# Patient Record
Sex: FEMALE | Race: WHITE | NOT HISPANIC OR LATINO | URBAN - METROPOLITAN AREA
[De-identification: names, ages, dates, MRNs, and addresses within clinical notes are randomized per-mention and may not be internally consistent; named-entity substitution may affect disease eponyms.]

---

## 2022-10-31 ENCOUNTER — APPOINTMENT (RX ONLY)
Dept: URBAN - METROPOLITAN AREA CLINIC 9 | Facility: CLINIC | Age: 52
Setting detail: DERMATOLOGY
End: 2022-10-31

## 2022-10-31 DIAGNOSIS — L82.1 OTHER SEBORRHEIC KERATOSIS: ICD-10-CM | Status: STABLE

## 2022-10-31 DIAGNOSIS — D22 MELANOCYTIC NEVI: ICD-10-CM | Status: STABLE

## 2022-10-31 DIAGNOSIS — L57.8 OTHER SKIN CHANGES DUE TO CHRONIC EXPOSURE TO NONIONIZING RADIATION: ICD-10-CM | Status: INADEQUATELY CONTROLLED

## 2022-10-31 DIAGNOSIS — L20.89 OTHER ATOPIC DERMATITIS: ICD-10-CM | Status: INADEQUATELY CONTROLLED

## 2022-10-31 DIAGNOSIS — D18.0 HEMANGIOMA: ICD-10-CM | Status: STABLE

## 2022-10-31 PROBLEM — L20.84 INTRINSIC (ALLERGIC) ECZEMA: Status: ACTIVE | Noted: 2022-10-31

## 2022-10-31 PROBLEM — D18.01 HEMANGIOMA OF SKIN AND SUBCUTANEOUS TISSUE: Status: ACTIVE | Noted: 2022-10-31

## 2022-10-31 PROBLEM — D22.5 MELANOCYTIC NEVI OF TRUNK: Status: ACTIVE | Noted: 2022-10-31

## 2022-10-31 PROCEDURE — ? PRESCRIPTION

## 2022-10-31 PROCEDURE — 99204 OFFICE O/P NEW MOD 45 MIN: CPT

## 2022-10-31 PROCEDURE — ? COSMETIC CONSULTATION: CHEMICAL PEELS

## 2022-10-31 PROCEDURE — ? COUNSELING

## 2022-10-31 PROCEDURE — ? PRESCRIPTION MEDICATION MANAGEMENT

## 2022-10-31 RX ORDER — TRIAMCINOLONE ACETONIDE 1 MG/G
CREAM TOPICAL BID
Qty: 80 | Refills: 3 | Status: ERX | COMMUNITY
Start: 2022-10-31

## 2022-10-31 RX ADMIN — TRIAMCINOLONE ACETONIDE: 1 CREAM TOPICAL at 00:00

## 2022-10-31 ASSESSMENT — LOCATION ZONE DERM
LOCATION ZONE: FACE
LOCATION ZONE: TRUNK
LOCATION ZONE: ARM

## 2022-10-31 ASSESSMENT — LOCATION SIMPLE DESCRIPTION DERM
LOCATION SIMPLE: LEFT CHEEK
LOCATION SIMPLE: LEFT FOREHEAD
LOCATION SIMPLE: ABDOMEN
LOCATION SIMPLE: RIGHT UPPER BACK
LOCATION SIMPLE: LEFT ELBOW

## 2022-10-31 ASSESSMENT — LOCATION DETAILED DESCRIPTION DERM
LOCATION DETAILED: LEFT INFERIOR FOREHEAD
LOCATION DETAILED: RIGHT LATERAL UPPER BACK
LOCATION DETAILED: LEFT INFERIOR CENTRAL MALAR CHEEK
LOCATION DETAILED: LEFT LATERAL ABDOMEN
LOCATION DETAILED: LEFT LATERAL ELBOW
LOCATION DETAILED: RIGHT SUPERIOR UPPER BACK

## 2022-10-31 NOTE — PROCEDURE: PRESCRIPTION MEDICATION MANAGEMENT
Detail Level: Zone
Render In Strict Bullet Format?: No
Initiate Treatment: PC Tretinoin 0.025% QHS
Initiate Treatment: Triamcinolone 0.1% cream BID x14 days

## 2022-10-31 NOTE — HPI: EVALUATION OF SKIN LESION(S)
What Type Of Note Output Would You Prefer (Optional)?: Bullet Format
Hpi Title: Evaluation of Skin Lesions
How Severe Are Your Spot(S)?: mild
Have Your Spot(S) Been Treated In The Past?: has not been treated
Additional History: Cosmetic options for sun spots \\nItchy patch on left arm happens when she’s stressed on and off for a few months

## 2025-02-24 ENCOUNTER — APPOINTMENT (OUTPATIENT)
Dept: URBAN - METROPOLITAN AREA CLINIC 9 | Facility: CLINIC | Age: 55
Setting detail: DERMATOLOGY
End: 2025-02-24

## 2025-02-24 DIAGNOSIS — L57.8 OTHER SKIN CHANGES DUE TO CHRONIC EXPOSURE TO NONIONIZING RADIATION: ICD-10-CM | Status: STABLE

## 2025-02-24 DIAGNOSIS — H01.13 ECZEMATOUS DERMATITIS OF EYELID: ICD-10-CM | Status: INADEQUATELY CONTROLLED

## 2025-02-24 DIAGNOSIS — L82.1 OTHER SEBORRHEIC KERATOSIS: ICD-10-CM | Status: STABLE

## 2025-02-24 DIAGNOSIS — D22 MELANOCYTIC NEVI: ICD-10-CM | Status: STABLE

## 2025-02-24 DIAGNOSIS — L81.4 OTHER MELANIN HYPERPIGMENTATION: ICD-10-CM | Status: STABLE

## 2025-02-24 DIAGNOSIS — D18.0 HEMANGIOMA: ICD-10-CM | Status: STABLE

## 2025-02-24 PROBLEM — D18.01 HEMANGIOMA OF SKIN AND SUBCUTANEOUS TISSUE: Status: ACTIVE | Noted: 2025-02-24

## 2025-02-24 PROBLEM — D22.62 MELANOCYTIC NEVI OF LEFT UPPER LIMB, INCLUDING SHOULDER: Status: ACTIVE | Noted: 2025-02-24

## 2025-02-24 PROBLEM — H01.139 ECZEMATOUS DERMATITIS OF UNSPECIFIED EYE, UNSPECIFIED EYELID: Status: ACTIVE | Noted: 2025-02-24

## 2025-02-24 PROBLEM — H01.134 ECZEMATOUS DERMATITIS OF LEFT UPPER EYELID: Status: ACTIVE | Noted: 2025-02-24

## 2025-02-24 PROCEDURE — 99213 OFFICE O/P EST LOW 20 MIN: CPT

## 2025-02-24 PROCEDURE — ? TREATMENT REGIMEN

## 2025-02-24 PROCEDURE — ? FULL BODY SKIN EXAM

## 2025-02-24 PROCEDURE — ? PRESCRIPTION

## 2025-02-24 PROCEDURE — ? ORDER FOR PATCH TESTING

## 2025-02-24 PROCEDURE — ? COUNSELING

## 2025-02-24 PROCEDURE — ? PRESCRIPTION MEDICATION MANAGEMENT

## 2025-02-24 RX ORDER — TACROLIMUS 1 MG/G
OINTMENT TOPICAL BID
Qty: 30 | Refills: 2 | Status: ERX | COMMUNITY
Start: 2025-02-24

## 2025-02-24 RX ADMIN — TACROLIMUS: 1 OINTMENT TOPICAL at 00:00

## 2025-02-24 ASSESSMENT — LOCATION DETAILED DESCRIPTION DERM
LOCATION DETAILED: LEFT PROXIMAL DORSAL FOREARM
LOCATION DETAILED: LEFT CENTRAL EYEBROW
LOCATION DETAILED: RIGHT MEDIAL SUPERIOR CHEST
LOCATION DETAILED: LEFT SUPERIOR UPPER BACK
LOCATION DETAILED: LEFT MEDIAL SUPERIOR EYELID
LOCATION DETAILED: EPIGASTRIC SKIN
LOCATION DETAILED: LEFT SUPERIOR MEDIAL UPPER BACK

## 2025-02-24 ASSESSMENT — LOCATION ZONE DERM
LOCATION ZONE: TRUNK
LOCATION ZONE: ARM
LOCATION ZONE: FACE
LOCATION ZONE: EYELID

## 2025-02-24 ASSESSMENT — LOCATION SIMPLE DESCRIPTION DERM
LOCATION SIMPLE: LEFT SUPERIOR EYELID
LOCATION SIMPLE: LEFT FOREARM
LOCATION SIMPLE: ABDOMEN
LOCATION SIMPLE: LEFT UPPER BACK
LOCATION SIMPLE: CHEST
LOCATION SIMPLE: LEFT EYEBROW

## 2025-02-24 NOTE — PROCEDURE: ORDER FOR PATCH TESTING
Patch Test Reading Schedule: First Reading at 48 hours and Second Reading at 72 hours
Patch Test To Be Applied: [employer] 80 Series
Detail Level: Simple
Counseling: I discussed the timing of the procedure and ensured the patient understands that this test requires multiple visits. No topical steroids applied should be applied to the patch testing location and no oral prednisone for two week prior to the test. While the patches are in place they should be kept dry which will limit bathing, swimming an exercise. I also explained that it is common for testing to be negative and this doesn't mean there isn't a allergic reaction occurring. During the testing itching is common.
Location Patches Should Be Applied: Back

## 2025-04-07 ENCOUNTER — APPOINTMENT (OUTPATIENT)
Dept: URBAN - METROPOLITAN AREA CLINIC 9 | Facility: CLINIC | Age: 55
Setting detail: DERMATOLOGY
End: 2025-04-07

## 2025-04-07 DIAGNOSIS — L259 CONTACT DERMATITIS AND OTHER ECZEMA, UNSPECIFIED CAUSE: ICD-10-CM | Status: INADEQUATELY CONTROLLED

## 2025-04-07 PROBLEM — L23.9 ALLERGIC CONTACT DERMATITIS, UNSPECIFIED CAUSE: Status: ACTIVE | Noted: 2025-04-07

## 2025-04-07 PROCEDURE — ? MDM - TREATMENT GOALS

## 2025-04-07 PROCEDURE — 95044 PATCH/APPLICATION TESTS: CPT

## 2025-04-07 PROCEDURE — ? PATCH TESTING

## 2025-04-07 ASSESSMENT — LOCATION ZONE DERM: LOCATION ZONE: TRUNK

## 2025-04-07 ASSESSMENT — LOCATION DETAILED DESCRIPTION DERM: LOCATION DETAILED: RIGHT SUPERIOR MEDIAL UPPER BACK

## 2025-04-07 ASSESSMENT — LOCATION SIMPLE DESCRIPTION DERM: LOCATION SIMPLE: RIGHT UPPER BACK

## 2025-04-07 NOTE — PROCEDURE: PATCH TESTING
Post-Care Instructions: I reviewed with the patient in detail post-care instructions. Patient should not sweat, pick at, or get the patches wet for 48 hours.
Detail Level: None
Consent: Verbal consent obtained, risks reviewed including but not limited to rash, itching, allergic reaction, systemic rash, remote possibility of anaphylaxis to allergen. Do NOT shower until after the final patch test reading at 72-96 hours.
Number Of Patches (Maximum Allowable Per Dos By Cms Is 90): 80

## 2025-04-10 ENCOUNTER — APPOINTMENT (OUTPATIENT)
Dept: URBAN - METROPOLITAN AREA CLINIC 9 | Facility: CLINIC | Age: 55
Setting detail: DERMATOLOGY
End: 2025-04-10

## 2025-04-10 DIAGNOSIS — L259 CONTACT DERMATITIS AND OTHER ECZEMA, UNSPECIFIED CAUSE: ICD-10-CM | Status: INADEQUATELY CONTROLLED

## 2025-04-10 PROBLEM — L23.9 ALLERGIC CONTACT DERMATITIS, UNSPECIFIED CAUSE: Status: ACTIVE | Noted: 2025-04-10

## 2025-04-10 PROCEDURE — 99213 OFFICE O/P EST LOW 20 MIN: CPT

## 2025-04-10 PROCEDURE — ? MDM - TREATMENT GOALS

## 2025-04-10 PROCEDURE — ? NORTH AMERICAN 80 PATCH TEST READING

## 2025-04-10 PROCEDURE — ? PRESCRIPTION MEDICATION MANAGEMENT

## 2025-04-10 ASSESSMENT — LOCATION DETAILED DESCRIPTION DERM: LOCATION DETAILED: LEFT MID-UPPER BACK

## 2025-04-10 ASSESSMENT — LOCATION SIMPLE DESCRIPTION DERM: LOCATION SIMPLE: LEFT UPPER BACK

## 2025-04-10 ASSESSMENT — LOCATION ZONE DERM: LOCATION ZONE: TRUNK

## 2025-04-10 NOTE — PROCEDURE: [employer]ICAN 80 PATCH TEST READING
Name Of Allergen 67: Lidocaine
Allergen 32 Reaction: no reaction
Name Of Allergen 79: Propylene Glycol
Name Of Allergen 36: Ethyleneurea, melamine formaldehyde mix
Name Of Allergen 75: Amidoamine
Name Of Allergen 34: Benzophenone-3/(2-Hydroxy-4-methoxybenzophenone)
Name Of Allergen 63: Iodopropynyl butyl carbamate
Name Of Allergen 64: 2-n-Octyl-4-isothiazolin-3-one
Name Of Allergen 18: Potassium dichromate
Name Of Allergen 17: N,N-Diphenylguanidine
Name Of Allergen 4: p-Phenylenediamine (PPD)
Name Of Allergen 73: Ethylhexyl salicylate
Name Of Allergen 77: Formaldehyde
Name Of Allergen 69: Dibucaine hydrochloride
Detail Level: Zone
Name Of Allergen 24: Mixed dialkyl thiourea
Name Of Allergen 35: Chloroxylenol (PCMX) / (4-chloro-3,5-xylenol (PCMX))
Name Of Allergen 56: DMDEM hydantoin
Name Of Allergen 61: Desoximetasone
Name Of Allergen 52: Benzyl salicylate
Name Of Allergen 7: Amerchol L 101
Name Of Allergen 21: Diazolidinylurea (Germall II)
Name Of Allergen 71: Isoamyl-p-methoxycinnamate
Name Of Allergen 6: Cinnamal / (Cinnamicaldehyde)
Name Of Allergen 12: Ethylenediamine dihydrochloride
Name Of Allergen 26: Paraben Mix
Name Of Allergen 59: Isopropyl myristate
Show Allergen Counseling In The Note?: No
Name Of Allergen 33: Propolis
Name Of Allergen 55: HEMA (2-hydroxyethyl methacrylate)
Name Of Allergen 28: Fragrance Mix
Name Of Allergen 53: Decyl glucoside
Name Of Allergen 62: Polysorbate 80/ (Polyoxyethylenesorbitanmonooleate (Tween 80))
Name Of Allergen 32: Thimerosal (Merthiolate)
Name Of Allergen 42: Methyl methacrylate
Name Of Allergen 60: Hydroperoxides of Limonene
Name Of Allergen 31: Sesquiterpene lactone mix
Name Of Allergen 57: Cananga odorata oil / (Ylang-Ylang oil)
Name Of Allergen 23: Bacitracin
Name Of Allergen 50: Frangrance Mix II
Name Of Allergen 70: Benzoyl peroxide
Allergen 52 Reaction: +/-
Name Of Allergen 46: Cocamide CHICO / (Coconut diethanolamide)
Name Of Allergen 20: Nickelsulfate Hexahydrate
Name Of Allergen 3: Colophonium / (Colophony)
Name Of Allergen 8: Carba Mix
Name Of Allergen 14: Quaternium-15 (Dowicil 200) / (1-(3-Chloroallyl)-3,5,0-lszimb-6-azoniaadamantane chloride)
Name Of Allergen 48: Textile dye mix
Name Of Allergen 22: Tocopherol/ (DL alpha tocopherol)
Name Of Allergen 78: Methylisothiazolinone + Methylchloroisothiazolinone / (Cl+-Me-isothiazolinone (Radha CG 200ppm))
Name Of Allergen 1: Benzocaine
Name Of Allergen 51: Disperse yellow 3
Name Of Allergen 39: Ethyl acrylate
Name Of Allergen 65: Disperse blue 106/124 Mix
Name Of Allergen 30: 2-Bromo-2-nitropropane-l,3-diol (Bronopol)
Name Of Allergen 25: Disperse orange 3
Name Of Allergen 74: Hydroperoxides of linalool
Name Of Allergen 80: Oleamidopropyl dimethylamine
Name Of Allergen 54: METHYLISOTHIAZOLINONE
Name Of Allergen 47: Triethanolamine
Name Of Allergen 9: Neomycin Sulfate
Name Of Allergen 2: 2-Mercaptobenzothiazole (MBT)
Name Of Allergen 27: Methyldibromo glutaronitrile (MDBGN)
Name Of Allergen 45: B-[facility]A [facility]
Name Of Allergen 40: Glyceryl monothioglycolate
Name Of Allergen 29: Glutatal / (Glutaraldehyde)
Name Of Allergen 11: Clobetasol-17-propionate
Name Of Allergen 10: Thiuram Mix
Name Of Allergen 38: Gold (I) sodium thiosulfate dihydrate
Name Of Allergen 72: Hydroxyisohexyl 3-Cyclohexene Carboxaldehyde (Lyral)
Name Of Allergen 49: Tea Tree Oil
Name Of Allergen 41: Toluenesulfonamide formaldehyde resin
Show Negative Results In The Note?: Yes
Name Of Allergen 15: 8-atwe-Adggvvbfqiwjqcpvtmlcpah resin
Name Of Allergen 37: 2-tert-Butyl-4-methoxyphenol (BHA)
Name Of Allergen 66: Compositae Mix II
Name Of Allergen 19: Myroxylon pereirae resin / (Balsam [place])
Number Of Patches Read: 80
Name Of Allergen 68: Flusidic acid sodium salt
Name Of Allergen 44: Tixocortol-21-pivalate
Name Of Allergen 76: Cocamidopropylbetaine
Name Of Allergen 13: Epoxy resin Bisphenol A
Name Of Allergen 43: Cobalt (II) chloride hexahydrate
Name Of Allergen 5: Imidazolidinyl urea Germall 115
What Reading Time Point?: 48 hour
Name Of Allergen 16: Mercapto mix
Name Of Allergen 58: Benzyl alcohol
Allergen 57 Reaction: 2+

## 2025-04-11 ENCOUNTER — APPOINTMENT (OUTPATIENT)
Dept: URBAN - METROPOLITAN AREA CLINIC 10 | Facility: CLINIC | Age: 55
Setting detail: DERMATOLOGY
End: 2025-04-11

## 2025-04-11 DIAGNOSIS — L259 CONTACT DERMATITIS AND OTHER ECZEMA, UNSPECIFIED CAUSE: ICD-10-CM | Status: INADEQUATELY CONTROLLED

## 2025-04-11 PROBLEM — L23.9 ALLERGIC CONTACT DERMATITIS, UNSPECIFIED CAUSE: Status: ACTIVE | Noted: 2025-04-11

## 2025-04-11 PROCEDURE — ? RECOMMENDATIONS

## 2025-04-11 PROCEDURE — ? MDM - TREATMENT GOALS

## 2025-04-11 PROCEDURE — ? NORTH AMERICAN 80 PATCH TEST READING

## 2025-04-11 PROCEDURE — 99212 OFFICE O/P EST SF 10 MIN: CPT

## 2025-04-11 PROCEDURE — ? COUNSELING

## 2025-04-11 ASSESSMENT — LOCATION SIMPLE DESCRIPTION DERM
LOCATION SIMPLE: UPPER BACK
LOCATION SIMPLE: LEFT UPPER ARM
LOCATION SIMPLE: LEFT UPPER BACK

## 2025-04-11 ASSESSMENT — LOCATION DETAILED DESCRIPTION DERM
LOCATION DETAILED: LEFT ANTERIOR DISTAL UPPER ARM
LOCATION DETAILED: SUPERIOR THORACIC SPINE
LOCATION DETAILED: LEFT MID-UPPER BACK

## 2025-04-11 ASSESSMENT — LOCATION ZONE DERM
LOCATION ZONE: TRUNK
LOCATION ZONE: ARM

## 2025-04-11 NOTE — PROCEDURE: RECOMMENDATIONS
Recommendation Preamble: The following recommendations were made during the visit: pt should do provocative testing to inner upper arm to test if allergic to products.
Render Risk Assessment In Note?: no
Detail Level: Zone

## 2025-04-11 NOTE — PROCEDURE: [employer] 80 PATCH TEST READING
Allergen 56 Reaction: no reaction
Name Of Allergen 54: METHYLISOTHIAZOLINONE
Name Of Allergen 30: 2-Bromo-2-nitropropane-l,3-diol (Bronopol)
Name Of Allergen 5: Imidazolidinyl urea Germall 115
Name Of Allergen 29: Glutatal / (Glutaraldehyde)
Name Of Allergen 80: Oleamidopropyl dimethylamine
Name Of Allergen 35: Chloroxylenol (PCMX) / (4-chloro-3,5-xylenol (PCMX))
Name Of Allergen 78: Methylisothiazolinone + Methylchloroisothiazolinone / (Cl+-Me-isothiazolinone (Radha CG 200ppm))
Name Of Allergen 18: Potassium dichromate
Name Of Allergen 66: Compositae Mix II
Name Of Allergen 13: Epoxy resin Bisphenol A
Name Of Allergen 38: Gold (I) sodium thiosulfate dihydrate
Name Of Allergen 25: Disperse orange 3
Name Of Allergen 51: Disperse yellow 3
Name Of Allergen 61: Desoximetasone
Name Of Allergen 79: Propylene Glycol
Name Of Allergen 45: B-033A [facility]
Name Of Allergen 73: Ethylhexyl salicylate
Name Of Allergen 72: Hydroxyisohexyl 3-Cyclohexene Carboxaldehyde (Lyral)
Name Of Allergen 64: 2-n-Octyl-4-isothiazolin-3-one
Name Of Allergen 71: Isoamyl-p-methoxycinnamate
Name Of Allergen 24: Mixed dialkyl thiourea
Name Of Allergen 2: 2-Mercaptobenzothiazole (MBT)
Name Of Allergen 53: Decyl glucoside
Name Of Allergen 8: Carba Mix
Name Of Allergen 49: Tea Tree Oil
Name Of Allergen 32: Thimerosal (Merthiolate)
Name Of Allergen 41: Toluenesulfonamide formaldehyde resin
Name Of Allergen 17: N,N-Diphenylguanidine
Name Of Allergen 57: Cananga odorata oil / (Ylang-Ylang oil)
Detail Level: Zone
Name Of Allergen 77: Formaldehyde
Name Of Allergen 7: Amerchol L 101
Name Of Allergen 26: Paraben Mix
Name Of Allergen 65: Disperse blue 106/124 Mix
Name Of Allergen 39: Ethyl acrylate
Name Of Allergen 58: Benzyl alcohol
Name Of Allergen 23: Bacitracin
Name Of Allergen 48: Textile dye mix
Name Of Allergen 31: Sesquiterpene lactone mix
Name Of Allergen 62: Polysorbate 80/ (Polyoxyethylenesorbitanmonooleate (Tween 80))
Name Of Allergen 14: Quaternium-15 (Dowicil 200) / (1-(3-Chloroallyl)-3,5,9-wqsqni-6-azoniaadamantane chloride)
Name Of Allergen 4: p-Phenylenediamine (PPD)
Name Of Allergen 34: Benzophenone-3/(2-Hydroxy-4-methoxybenzophenone)
Name Of Allergen 74: Hydroperoxides of linalool
Name Of Allergen 47: Triethanolamine
Name Of Allergen 63: Iodopropynyl butyl carbamate
Name Of Allergen 22: Tocopherol/ (DL alpha tocopherol)
Name Of Allergen 40: Glyceryl monothioglycolate
Name Of Allergen 70: Benzoyl peroxide
Name Of Allergen 19: Myroxylon pereirae resin / (Balsam [place])
Name Of Allergen 69: Dibucaine hydrochloride
Name Of Allergen 56: DMDEM hydantoin
Name Of Allergen 21: Diazolidinylurea (Germall II)
Name Of Allergen 10: Thiuram Mix
Name Of Allergen 44: Tixocortol-21-pivalate
Name Of Allergen 33: Propolis
Name Of Allergen 28: Fragrance Mix
Name Of Allergen 16: Mercapto mix
Show Negative Results In The Note?: Yes
Name Of Allergen 59: Isopropyl myristate
Name Of Allergen 68: Flusidic acid sodium salt
Name Of Allergen 3: Colophonium / (Colophony)
Name Of Allergen 1: Benzocaine
Name Of Allergen 12: Ethylenediamine dihydrochloride
Allergen 57 Reaction: 2+
Name Of Allergen 50: Frangrance Mix II
Name Of Allergen 52: Benzyl salicylate
Show Allergen Counseling In The Note?: No
What Reading Time Point?: 72 hour
Name Of Allergen 15: 5-kcmb-Tnqaiknngnseqqejfilmdkx resin
Name Of Allergen 42: Methyl methacrylate
Name Of Allergen 20: Nickelsulfate Hexahydrate
Name Of Allergen 6: Cinnamal / (Cinnamicaldehyde)
Name Of Allergen 27: Methyldibromo glutaronitrile (MDBGN)
Name Of Allergen 55: HEMA (2-hydroxyethyl methacrylate)
Name Of Allergen 43: Cobalt (II) chloride hexahydrate
Name Of Allergen 11: Clobetasol-17-propionate
Name Of Allergen 9: Neomycin Sulfate
Name Of Allergen 36: Ethyleneurea, melamine formaldehyde mix
Name Of Allergen 75: Amidoamine
Number Of Patches Read: 80
Name Of Allergen 46: Cocamide CHICO / (Coconut diethanolamide)
Name Of Allergen 60: Hydroperoxides of Limonene
Name Of Allergen 37: 2-tert-Butyl-4-methoxyphenol (BHA)
Name Of Allergen 76: Cocamidopropylbetaine
Name Of Allergen 67: Lidocaine